# Patient Record
Sex: MALE | Employment: STUDENT | URBAN - METROPOLITAN AREA
[De-identification: names, ages, dates, MRNs, and addresses within clinical notes are randomized per-mention and may not be internally consistent; named-entity substitution may affect disease eponyms.]

---
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
## 2022-06-29 NOTE — ED PROVIDER NOTES
Patient Seen in: THE [facility] Emergency Department In [place]    History   Patient presents with:  Sore Throat    Stated Complaint: sore throat    HPI    64-year-old male presents to the emergency department with his mother with a change in his voice, gener [Cessation strategies including cessation program discussed] : Cessation strategies including cessation program discussed Reviewed   MONONUCLEOSIS, QUAL - Abnormal; Notable for the following components:       Result Value    Monoscreen Positive (*)     All other components within normal limits   COMP METABOLIC PANEL (14) - Abnormal; Notable for the following components:    Gl [Use of nicotine replacement therapies and other medications discussed] : Use of nicotine replacement therapies and other medications discussed (primary encounter diagnosis)  Acute viral pharyngitis    Disposition:  Discharge  3/21/2019 10:22 am    Follow-up:  Rosa Blankenship, [phone]    Schedule an appointment as soon as possible for a visit          Medi [Yes] : Willing to quit smoking [FreeTextEntry1] : 4

## (undated) NOTE — LETTER
Date & Time: 3/21/2019, 10:22 AM  Patient: Dawn Atkins  Encounter Provider(s):    Seng White MD       To Whom It May Concern:    Dawn Atkins was seen and treated in our department on 3/21/2019.  He has mononucleosis and may be ill for a protract

## (undated) NOTE — ED AVS SNAPSHOT
Erwin Burris   MRN: [id number]    Department:  [facility] in [place]   Date of Visit:  3/21/2019           Disclosure     Insurance plans vary and the physician(s) referred by the ER may not be covered by your plan.  Please contact tell this physician (or your personal doctor if your instructions are to return to your personal doctor) about any new or lasting problems. The primary care or specialist physician will see patients referred from the [facility] Emergency Department.  Yannick Short